# Patient Record
Sex: FEMALE | Race: WHITE | NOT HISPANIC OR LATINO | Employment: UNEMPLOYED | URBAN - METROPOLITAN AREA
[De-identification: names, ages, dates, MRNs, and addresses within clinical notes are randomized per-mention and may not be internally consistent; named-entity substitution may affect disease eponyms.]

---

## 2019-10-07 ENCOUNTER — OFFICE VISIT (OUTPATIENT)
Dept: FAMILY MEDICINE CLINIC | Facility: CLINIC | Age: 54
End: 2019-10-07

## 2019-10-07 VITALS
WEIGHT: 126 LBS | BODY MASS INDEX: 23.19 KG/M2 | OXYGEN SATURATION: 97 % | HEIGHT: 62 IN | HEART RATE: 66 BPM | SYSTOLIC BLOOD PRESSURE: 118 MMHG | TEMPERATURE: 96.3 F | DIASTOLIC BLOOD PRESSURE: 79 MMHG

## 2019-10-07 DIAGNOSIS — F51.01 PRIMARY INSOMNIA: ICD-10-CM

## 2019-10-07 DIAGNOSIS — F32.A DEPRESSION, UNSPECIFIED DEPRESSION TYPE: ICD-10-CM

## 2019-10-07 DIAGNOSIS — J40 BRONCHITIS: Primary | ICD-10-CM

## 2019-10-07 PROCEDURE — 99214 OFFICE O/P EST MOD 30 MIN: CPT | Performed by: FAMILY MEDICINE

## 2019-10-07 RX ORDER — CITALOPRAM 20 MG/1
20 TABLET ORAL DAILY
Qty: 90 TABLET | Refills: 3 | Status: SHIPPED | OUTPATIENT
Start: 2019-10-07 | End: 2020-05-19 | Stop reason: SDUPTHER

## 2019-10-07 RX ORDER — TRAZODONE HYDROCHLORIDE 50 MG/1
50 TABLET ORAL NIGHTLY
Qty: 30 TABLET | Refills: 3 | Status: SHIPPED | OUTPATIENT
Start: 2019-10-07 | End: 2020-01-22 | Stop reason: SDUPTHER

## 2019-10-07 RX ORDER — TRAZODONE HYDROCHLORIDE 50 MG/1
TABLET ORAL
COMMUNITY
Start: 2016-12-30 | End: 2019-10-07 | Stop reason: SDUPTHER

## 2019-10-07 RX ORDER — ALPRAZOLAM 0.5 MG/1
0.5 TABLET ORAL NIGHTLY PRN
Qty: 30 TABLET | Refills: 0 | Status: SHIPPED | OUTPATIENT
Start: 2019-10-07 | End: 2020-04-27

## 2019-10-07 RX ORDER — AMOXICILLIN 500 MG/1
500 CAPSULE ORAL 3 TIMES DAILY
Qty: 30 CAPSULE | Refills: 0 | Status: SHIPPED | OUTPATIENT
Start: 2019-10-07 | End: 2019-12-09

## 2019-10-07 NOTE — PROGRESS NOTES
Subjective   Chief Complaint   Patient presents with   • URI   • Depression     she wants to start taking meds again     Taylor Mo is a 53 y.o. female.     URI    This is a new problem. The current episode started in the past 7 days. The problem has been waxing and waning. There has been no fever. Associated symptoms include congestion, coughing and wheezing. Pertinent negatives include no abdominal pain, chest pain, ear pain, headaches, rash, rhinorrhea, sinus pain, sneezing or sore throat. She has tried nothing for the symptoms.   Depression   Visit Type: follow-up  Patient presents with the following symptoms: depressed mood, insomnia and psychomotor retardation.  Patient is not experiencing: palpitations, shortness of breath, suicidal ideas, suicidal planning and thoughts of death.  Frequency of symptoms: most days   Severity: moderate   Sleep quality: fair  Nighttime awakenings: occasional  Compliance with medications: She was previously on Celexa but stopped it after feeling better.        Insomnia   This is a chronic problem. The current episode started more than 1 year ago. The problem has been waxing and waning. Associated symptoms include congestion and coughing. Pertinent negatives include no abdominal pain, chest pain, chills, fever, headaches, rash or sore throat.      Past Medical History:   Diagnosis Date   • Depression    • Insomnia      Past Surgical History:   Procedure Laterality Date   • HERNIA REPAIR  2016     No Known Allergies  Social History     Socioeconomic History   • Marital status:      Spouse name: Not on file   • Number of children: Not on file   • Years of education: Not on file   • Highest education level: Not on file   Tobacco Use   • Smoking status: Former Smoker   • Smokeless tobacco: Never Used   • Tobacco comment: quit in 2018, started smoking age 14   Substance and Sexual Activity   • Alcohol use: Yes     Frequency: Monthly or less     Comment: caff. 2-3c qd   •  "Drug use: No     Social History     Tobacco Use   Smoking Status Former Smoker   Smokeless Tobacco Never Used   Tobacco Comment    quit in 2018, started smoking age 14       family history includes Hypertension in her brother, father, mother, and sister.  Current Outpatient Medications on File Prior to Visit   Medication Sig Dispense Refill   • [DISCONTINUED] traZODone (DESYREL) 50 MG tablet TRAZODONE HCL 50 MG TABS       No current facility-administered medications on file prior to visit.      Patient Active Problem List   Diagnosis   • Depression   • Insomnia   • Bronchitis       The following portions of the patient's history were reviewed and updated as appropriate: allergies, current medications, past family history, past medical history, past social history, past surgical history and problem list.    Review of Systems   Constitutional: Negative for chills and fever.   HENT: Positive for congestion. Negative for ear pain, rhinorrhea, sinus pressure, sneezing and sore throat.    Eyes: Negative for blurred vision.   Respiratory: Positive for cough and wheezing. Negative for shortness of breath.    Cardiovascular: Negative for chest pain and palpitations.   Gastrointestinal: Negative for abdominal pain.   Endocrine: Negative for polyuria.   Skin: Negative for rash.   Neurological: Negative for dizziness and headache.   Hematological: Negative for adenopathy.   Psychiatric/Behavioral: Positive for depressed mood. Negative for suicidal ideas. The patient has insomnia.        Objective   /79 (BP Location: Left arm, Patient Position: Sitting, Cuff Size: Adult)   Pulse 66   Temp 96.3 °F (35.7 °C) (Tympanic)   Ht 157.5 cm (62\")   Wt 57.2 kg (126 lb)   SpO2 97%   BMI 23.05 kg/m²   Physical Exam   Constitutional: She is oriented to person, place, and time. She appears well-developed. No distress.   HENT:   Head: Normocephalic.   Eyes: Conjunctivae and lids are normal.   Neck: Trachea normal. No thyroid mass " and no thyromegaly present.   Cardiovascular: Normal rate, regular rhythm and normal heart sounds.   Pulmonary/Chest: Effort normal. She has wheezes.   Lymphadenopathy:     She has no cervical adenopathy.   Neurological: She is alert and oriented to person, place, and time.   Skin: Skin is warm and dry.   Psychiatric: She has a normal mood and affect. Her speech is normal and behavior is normal. She is attentive.       No visits with results within 1 Week(s) from this visit.   Latest known visit with results is:   No results found for any previous visit.           Assessment/Plan   Problems Addressed this Visit        Respiratory    Bronchitis - Primary     Amoxil as prescribed.            Other    Depression     Psychological condition is worsening.  Medication changes per orders.  Psychological condition  will be reassessed at the next regular appointment.         Relevant Medications    traZODone (DESYREL) 50 MG tablet    citalopram (CELEXA) 20 MG tablet    ALPRAZolam (XANAX) 0.5 MG tablet    Insomnia     Restart Trazodone.  Alprazolam if needed.               Taylor was seen today for uri and depression.    Diagnoses and all orders for this visit:    Bronchitis    Primary insomnia    Depression, unspecified depression type    Other orders  -     amoxicillin (AMOXIL) 500 MG capsule; Take 1 capsule by mouth 3 (Three) Times a Day.  -     traZODone (DESYREL) 50 MG tablet; Take 1 tablet by mouth Every Night.  -     citalopram (CELEXA) 20 MG tablet; Take 1 tablet by mouth Daily.  -     ALPRAZolam (XANAX) 0.5 MG tablet; Take 1 tablet by mouth At Night As Needed for Anxiety.

## 2019-12-09 ENCOUNTER — OFFICE VISIT (OUTPATIENT)
Dept: SURGERY | Facility: CLINIC | Age: 54
End: 2019-12-09

## 2019-12-09 VITALS
HEART RATE: 73 BPM | WEIGHT: 128.2 LBS | OXYGEN SATURATION: 98 % | TEMPERATURE: 97.3 F | SYSTOLIC BLOOD PRESSURE: 121 MMHG | BODY MASS INDEX: 23.59 KG/M2 | DIASTOLIC BLOOD PRESSURE: 78 MMHG | HEIGHT: 62 IN

## 2019-12-09 DIAGNOSIS — K43.9 ABDOMINAL WALL HERNIA: Primary | ICD-10-CM

## 2019-12-09 DIAGNOSIS — R10.10 PAIN OF UPPER ABDOMEN: ICD-10-CM

## 2019-12-09 DIAGNOSIS — R10.33 PERIUMBILICAL ABDOMINAL PAIN: ICD-10-CM

## 2019-12-09 PROBLEM — Z83.3 FAMILY HISTORY OF DIABETES MELLITUS: Status: ACTIVE | Noted: 2019-12-09

## 2019-12-09 PROCEDURE — 99212 OFFICE O/P EST SF 10 MIN: CPT | Performed by: SURGERY

## 2019-12-09 NOTE — PROGRESS NOTES
Subjective   Taylor Mo is a 53 y.o. female.     History of present illness  Katelyn is seen in the office today for abdominal pain that seems to be located above the umbilicus.  In half years ago she had a laparoscopic umbilical hernia repair done with Davol dual Composix mesh/4 x 6 inch.  Is having pain and a pulling sensation in the midline near where the top of the mesh would be.  Positions make it worse if she sits and scrunches upper bends and turns a certain way it seems to cause a discomfort and a pulling.  I suspect that she probably has omentum stuck to the area and that it is pulling.  I explained there is no way to find that out short of doing a diagnostic laparoscopy.    I have suggested that she try physical therapy to see if that help will help.  If it does not then a diagnostic laparoscopy would be in order.    Past Medical History:   Diagnosis Date   • Depression    • Insomnia        Past Surgical History:   Procedure Laterality Date   • HERNIA REPAIR  2016       Outpatient Encounter Medications as of 12/9/2019   Medication Sig Dispense Refill   • ALPRAZolam (XANAX) 0.5 MG tablet Take 1 tablet by mouth At Night As Needed for Anxiety. 30 tablet 0   • traZODone (DESYREL) 50 MG tablet Take 1 tablet by mouth Every Night. 30 tablet 3   • citalopram (CELEXA) 20 MG tablet Take 1 tablet by mouth Daily. 90 tablet 3   • [DISCONTINUED] amoxicillin (AMOXIL) 500 MG capsule Take 1 capsule by mouth 3 (Three) Times a Day. 30 capsule 0     No facility-administered encounter medications on file as of 12/9/2019.        No Known Allergies    Family History   Problem Relation Age of Onset   • Hypertension Mother    • Hypertension Father    • Hypertension Sister    • Hypertension Brother        Social History     Socioeconomic History   • Marital status:      Spouse name: Not on file   • Number of children: Not on file   • Years of education: Not on file   • Highest education level: Not on file   Tobacco Use   •  Smoking status: Former Smoker   • Smokeless tobacco: Never Used   • Tobacco comment: quit in 2018, started smoking age 14   Substance and Sexual Activity   • Alcohol use: Yes     Frequency: Monthly or less     Comment: caff. 2-3c qd   • Drug use: No       The following portions of the patient's history were reviewed and updated as appropriate: allergies, current medications, past family history, past medical history, past social history, past surgical history and problem list.    Objective       Assessment/Plan   There are no diagnoses linked to this encounter.    All systems are reviewed and unremarkable with exception of the chief complaint    Physical exam shows a pleasant 53-year-old female.  HEENT is negative.  Heart is regular.  Lungs are clear.  Abdomen is soft tender in the midline above the umbilicus.  No obvious hernia recurrence.  No palpable mass.  Extremities show equal range of motion in the upper and lower extremities.  She has symmetrical strength and usage.  Neuro shows no obvious focal deficit.    Impression: Abdominal pain at the level of the mesh    Recommendation: Physical therapy.  If that does not help we would recommend diagnostic laparoscopy           Lyndon Turner,   12/9/2019  3:07 PM

## 2020-01-02 ENCOUNTER — TREATMENT (OUTPATIENT)
Dept: PHYSICAL THERAPY | Facility: CLINIC | Age: 55
End: 2020-01-02

## 2020-01-02 DIAGNOSIS — R10.10 PAIN OF UPPER ABDOMEN: Primary | ICD-10-CM

## 2020-01-02 PROCEDURE — 97110 THERAPEUTIC EXERCISES: CPT | Performed by: PHYSICAL THERAPIST

## 2020-01-02 PROCEDURE — 97162 PT EVAL MOD COMPLEX 30 MIN: CPT | Performed by: PHYSICAL THERAPIST

## 2020-01-02 NOTE — PROGRESS NOTES
"  Physical Therapy Initial Evaluation and Plan of Care      Patient: Taylor Mo   : 1965  Diagnosis/ICD-10 Code:  Pain of upper abdomen [R10.10]  Referring practitioner: Lyndon Turner DO  Date of Initial Visit: 2020  Today's Date: 2020  Patient seen for 1 sessions           Subjective Evaluation    History of Present Illness  Mechanism of injury: Patient presents to physical therapy with chief complaint of upper abdominal pain.  Patient reports that she has abdominal wall hernia repair in 2016.  Reports that she has continued to have pain in abdomen since.  Reports that she cannot feel it now, however reports that recently when she was \"thinner\" she states that she could feel small mass in area and MD believed it to be scar tissue.  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     Pain  Current pain ratin  At worst pain ratin  Quality: pulling and burning  Relieving factors: change in position and medications  Aggravating factors: lifting and squatting    Patient Goals  Patient goals for therapy: decreased pain, increased strength and return to sport/leisure activities  Patient goal: Wishes to have less pain with holding grandbaby.            Objective       Palpation     Additional Palpation Details  TTP noted at midline rectus abdominus superior to umbilical.  No soft tissue restriction noted with palpation with muscular relaxation or contraction.           Assessment & Plan     Assessment  Impairments: lacks appropriate home exercise program  Assessment details: Patient presents to physical therapy with chief complaint of abdominal pain near area of past repair of abdominal wall hernia.  Patient educated on abdominal stabilization exercise, as well as educated on proper lifting mechanics for use at work.  Initiated abdominal stabilization HEP.  Patient educated to " return for treatment if symptoms return or if proper technique with exercises cannot be achieved.    Prognosis: good  Functional Limitations: lifting  Goals  Plan Goals: In two weeks, patient will report compliance with I HEP.     In two weeks, patient will report at least 25% reduction in pain in abdomen with lifting at work.     Plan  Therapy options: will be seen for skilled physical therapy services  Planned therapy interventions: abdominal trunk stabilization, functional ROM exercises, home exercise program, IADL retraining, manual therapy, neuromuscular re-education, soft tissue mobilization, strengthening and body mechanics training  Frequency: 1x week  Duration in visits: 6        Manual Therapy:         mins  02082;  Therapeutic Exercise:    25     mins  10701;     Neuromuscular Concha:        mins  98425;    Therapeutic Activity:          mins  87213;     Gait Training:           mins  60760;     Ultrasound:          mins  45116;    Electrical Stimulation:         mins  85360 ( );  Dry Needling          mins self-pay    Timed Treatment:   25   mins   Total Treatment:     55   mins    PT SIGNATURE: Zeke Montoya, SAUL   DATE TREATMENT INITIATED: 1/2/2020    Initial Certification  Certification Period: 4/1/2020  I certify that the therapy services are furnished while this patient is under my care.  The services outlined above are required by this patient, and will be reviewed every 90 days.     PHYSICIAN: Lyndon Turner, DO      DATE:     Please sign and return via fax to [phone].. Thank you, [facility].

## 2020-01-22 RX ORDER — TRAZODONE HYDROCHLORIDE 50 MG/1
50 TABLET ORAL NIGHTLY
Qty: 90 TABLET | Refills: 0 | Status: SHIPPED | OUTPATIENT
Start: 2020-01-22 | End: 2020-11-02

## 2020-04-27 DIAGNOSIS — F41.9 ANXIETY: Primary | ICD-10-CM

## 2020-04-27 RX ORDER — ALPRAZOLAM 0.5 MG/1
TABLET ORAL
Qty: 30 TABLET | Refills: 0 | Status: SHIPPED | OUTPATIENT
Start: 2020-04-27 | End: 2021-05-14

## 2020-05-19 ENCOUNTER — APPOINTMENT (OUTPATIENT)
Dept: LAB | Facility: HOSPITAL | Age: 55
End: 2020-05-19

## 2020-05-19 ENCOUNTER — OFFICE VISIT (OUTPATIENT)
Dept: FAMILY MEDICINE CLINIC | Facility: CLINIC | Age: 55
End: 2020-05-19

## 2020-05-19 VITALS
HEIGHT: 64 IN | HEART RATE: 64 BPM | BODY MASS INDEX: 22.88 KG/M2 | TEMPERATURE: 97.7 F | WEIGHT: 134 LBS | DIASTOLIC BLOOD PRESSURE: 73 MMHG | OXYGEN SATURATION: 97 % | SYSTOLIC BLOOD PRESSURE: 119 MMHG

## 2020-05-19 DIAGNOSIS — Z12.31 ENCOUNTER FOR SCREENING MAMMOGRAM FOR BREAST CANCER: Primary | ICD-10-CM

## 2020-05-19 DIAGNOSIS — Z00.00 WELLNESS EXAMINATION: ICD-10-CM

## 2020-05-19 DIAGNOSIS — Z78.0 POSTMENOPAUSAL: ICD-10-CM

## 2020-05-19 LAB
ALBUMIN SERPL-MCNC: 4.4 G/DL (ref 3.5–5.2)
ALBUMIN/GLOB SERPL: 1.4 G/DL
ALP SERPL-CCNC: 91 U/L (ref 39–117)
ALT SERPL W P-5'-P-CCNC: 11 U/L (ref 1–33)
ANION GAP SERPL CALCULATED.3IONS-SCNC: 9.3 MMOL/L (ref 5–15)
AST SERPL-CCNC: 24 U/L (ref 1–32)
BILIRUB SERPL-MCNC: 0.4 MG/DL (ref 0.2–1.2)
BUN BLD-MCNC: 12 MG/DL (ref 6–20)
BUN/CREAT SERPL: 13.8 (ref 7–25)
CALCIUM SPEC-SCNC: 9.8 MG/DL (ref 8.6–10.5)
CHLORIDE SERPL-SCNC: 101 MMOL/L (ref 98–107)
CHOLEST SERPL-MCNC: 210 MG/DL (ref 0–200)
CO2 SERPL-SCNC: 28.7 MMOL/L (ref 22–29)
CREAT BLD-MCNC: 0.87 MG/DL (ref 0.57–1)
GFR SERPL CREATININE-BSD FRML MDRD: 67 ML/MIN/1.73
GLOBULIN UR ELPH-MCNC: 3.2 GM/DL
GLUCOSE BLD-MCNC: 94 MG/DL (ref 65–99)
HDLC SERPL-MCNC: 68 MG/DL (ref 40–60)
LDLC SERPL CALC-MCNC: 111 MG/DL (ref 0–100)
LDLC/HDLC SERPL: 1.63 {RATIO}
POTASSIUM BLD-SCNC: 4.2 MMOL/L (ref 3.5–5.2)
PROT SERPL-MCNC: 7.6 G/DL (ref 6–8.5)
SODIUM BLD-SCNC: 139 MMOL/L (ref 136–145)
TRIGL SERPL-MCNC: 157 MG/DL (ref 0–150)
VLDLC SERPL-MCNC: 31.4 MG/DL (ref 5–40)

## 2020-05-19 PROCEDURE — 36415 COLL VENOUS BLD VENIPUNCTURE: CPT | Performed by: FAMILY MEDICINE

## 2020-05-19 PROCEDURE — 80061 LIPID PANEL: CPT | Performed by: FAMILY MEDICINE

## 2020-05-19 PROCEDURE — 80053 COMPREHEN METABOLIC PANEL: CPT | Performed by: FAMILY MEDICINE

## 2020-05-19 PROCEDURE — 99396 PREV VISIT EST AGE 40-64: CPT | Performed by: FAMILY MEDICINE

## 2020-05-19 RX ORDER — CITALOPRAM 40 MG/1
40 TABLET ORAL DAILY
Qty: 90 TABLET | Refills: 1 | Status: SHIPPED | OUTPATIENT
Start: 2020-05-19 | End: 2021-06-07

## 2020-05-19 NOTE — PROGRESS NOTES
"Francis Mo is a 54 y.o. female and is here for a comprehensive physical exam. The patient reports no problems.    Do you take any herbs or supplements that were not prescribed by a doctor? no  Are you taking calcium supplements? no  Are you taking aspirin daily? no    The following portions of the patient's history were reviewed and updated as appropriate: allergies, current medications, past family history, past medical history, past social history, past surgical history and problem list.    Review of Systems  Do you have pain that bothers you in your daily life? yes  A comprehensive review of systems was negative except for: Musculoskeletal: positive for arthralgias    Objective   /73   Pulse 64   Temp 97.7 °F (36.5 °C)   Ht 161.9 cm (63.75\")   Wt 60.8 kg (134 lb)   SpO2 97%   BMI 23.18 kg/m²   General appearance: alert, appears stated age and cooperative  Head: Normocephalic, without obvious abnormality, atraumatic  Eyes: conjunctivae/corneas clear. PERRL, EOM's intact. Fundi benign.  Ears: normal TM's and external ear canals both ears  Neck: no adenopathy, no carotid bruit, no JVD, supple, symmetrical, trachea midline and thyroid not enlarged, symmetric, no tenderness/mass/nodules  Lungs: clear to auscultation bilaterally  Heart: regular rate and rhythm, S1, S2 normal, no murmur, click, rub or gallop  Abdomen: soft, non-tender; bowel sounds normal; no masses,  no organomegaly  Extremities: extremities normal, atraumatic, no cyanosis or edema  Skin: Skin color, texture, turgor normal. No rashes or lesions  Lymph nodes: Cervical, supraclavicular, and axillary nodes normal.  Neurologic: Grossly normal     No visits with results within 1 Week(s) from this visit.   Latest known visit with results is:   No results found for any previous visit.       Assessment/Plan   Healthy female exam.    There are no diagnoses linked to this encounter.  1. Well exam  2. Patient Counseling:  --Nutrition: " Stressed importance of moderation in sodium/caffeine intake, saturated fat and cholesterol, caloric balance, sufficient intake of fresh fruits, vegetables, fiber, calcium, iron, and 1 mg of folate supplement per day (for females capable of pregnancy).  --Discussed the issue of estrogen replacement, calcium supplement, and the daily use of baby aspirin.  --Exercise: Stressed the importance of regular exercise.   --Immunizations reviewed.  --Discussed benefits of screening colonoscopy.      3. Discussed the patient's BMI with her.  The BMI is in the acceptable range  4. Follow up in one year

## 2020-05-28 ENCOUNTER — TELEPHONE (OUTPATIENT)
Dept: FAMILY MEDICINE CLINIC | Facility: CLINIC | Age: 55
End: 2020-05-28

## 2020-06-01 NOTE — TELEPHONE ENCOUNTER
Pt called back and stated that she is willing to try the weekly pill. Send to [facility] on [address]

## 2020-06-02 RX ORDER — ALENDRONATE SODIUM 70 MG/1
70 TABLET ORAL
Qty: 12 TABLET | Refills: 3 | Status: SHIPPED | OUTPATIENT
Start: 2020-06-02 | End: 2021-05-03

## 2020-11-02 RX ORDER — TRAZODONE HYDROCHLORIDE 50 MG/1
TABLET ORAL
Qty: 90 TABLET | Refills: 0 | Status: SHIPPED | OUTPATIENT
Start: 2020-11-02 | End: 2021-01-27

## 2021-01-27 RX ORDER — TRAZODONE HYDROCHLORIDE 50 MG/1
TABLET ORAL
Qty: 90 TABLET | Refills: 0 | Status: SHIPPED | OUTPATIENT
Start: 2021-01-27 | End: 2021-04-30

## 2021-04-30 RX ORDER — TRAZODONE HYDROCHLORIDE 50 MG/1
TABLET ORAL
Qty: 90 TABLET | Refills: 0 | Status: SHIPPED | OUTPATIENT
Start: 2021-04-30 | End: 2021-07-25

## 2021-05-03 RX ORDER — ALENDRONATE SODIUM 70 MG/1
70 TABLET ORAL
Qty: 12 TABLET | Refills: 3 | Status: SHIPPED | OUTPATIENT
Start: 2021-05-03 | End: 2022-04-11

## 2021-05-13 DIAGNOSIS — F41.9 ANXIETY: ICD-10-CM

## 2021-05-14 RX ORDER — ALPRAZOLAM 0.5 MG/1
TABLET ORAL
Qty: 30 TABLET | Refills: 0 | Status: SHIPPED | OUTPATIENT
Start: 2021-05-14 | End: 2021-06-02 | Stop reason: SDUPTHER

## 2021-06-02 ENCOUNTER — LAB (OUTPATIENT)
Dept: FAMILY MEDICINE CLINIC | Facility: CLINIC | Age: 56
End: 2021-06-02

## 2021-06-02 ENCOUNTER — OFFICE VISIT (OUTPATIENT)
Dept: FAMILY MEDICINE CLINIC | Facility: CLINIC | Age: 56
End: 2021-06-02

## 2021-06-02 VITALS
OXYGEN SATURATION: 95 % | BODY MASS INDEX: 22.88 KG/M2 | DIASTOLIC BLOOD PRESSURE: 70 MMHG | WEIGHT: 134 LBS | TEMPERATURE: 97.3 F | SYSTOLIC BLOOD PRESSURE: 119 MMHG | HEIGHT: 64 IN | HEART RATE: 72 BPM

## 2021-06-02 DIAGNOSIS — F41.9 ANXIETY: ICD-10-CM

## 2021-06-02 DIAGNOSIS — Z11.59 NEED FOR HEPATITIS C SCREENING TEST: ICD-10-CM

## 2021-06-02 DIAGNOSIS — Z00.00 WELLNESS EXAMINATION: Primary | ICD-10-CM

## 2021-06-02 LAB
ALBUMIN SERPL-MCNC: 4.5 G/DL (ref 3.5–5.2)
ALBUMIN/GLOB SERPL: 1.6 G/DL
ALP SERPL-CCNC: 93 U/L (ref 39–117)
ALT SERPL W P-5'-P-CCNC: 7 U/L (ref 1–33)
ANION GAP SERPL CALCULATED.3IONS-SCNC: 10 MMOL/L (ref 5–15)
AST SERPL-CCNC: 23 U/L (ref 1–32)
BILIRUB SERPL-MCNC: 0.4 MG/DL (ref 0–1.2)
BUN SERPL-MCNC: 12 MG/DL (ref 6–20)
BUN/CREAT SERPL: 15.4 (ref 7–25)
CALCIUM SPEC-SCNC: 9.2 MG/DL (ref 8.6–10.5)
CHLORIDE SERPL-SCNC: 102 MMOL/L (ref 98–107)
CHOLEST SERPL-MCNC: 256 MG/DL (ref 0–200)
CO2 SERPL-SCNC: 27 MMOL/L (ref 22–29)
CREAT SERPL-MCNC: 0.78 MG/DL (ref 0.57–1)
GFR SERPL CREATININE-BSD FRML MDRD: 76 ML/MIN/1.73
GLOBULIN UR ELPH-MCNC: 2.8 GM/DL
GLUCOSE SERPL-MCNC: 92 MG/DL (ref 65–99)
HCV AB SER DONR QL: NORMAL
HDLC SERPL-MCNC: 66 MG/DL (ref 40–60)
LDLC SERPL CALC-MCNC: 159 MG/DL (ref 0–100)
LDLC/HDLC SERPL: 2.36 {RATIO}
POTASSIUM SERPL-SCNC: 4 MMOL/L (ref 3.5–5.2)
PROT SERPL-MCNC: 7.3 G/DL (ref 6–8.5)
SODIUM SERPL-SCNC: 139 MMOL/L (ref 136–145)
TRIGL SERPL-MCNC: 170 MG/DL (ref 0–150)
VLDLC SERPL-MCNC: 31 MG/DL (ref 5–40)

## 2021-06-02 PROCEDURE — 80061 LIPID PANEL: CPT | Performed by: FAMILY MEDICINE

## 2021-06-02 PROCEDURE — 80053 COMPREHEN METABOLIC PANEL: CPT | Performed by: FAMILY MEDICINE

## 2021-06-02 PROCEDURE — 36415 COLL VENOUS BLD VENIPUNCTURE: CPT | Performed by: FAMILY MEDICINE

## 2021-06-02 PROCEDURE — 86803 HEPATITIS C AB TEST: CPT | Performed by: FAMILY MEDICINE

## 2021-06-02 PROCEDURE — 99396 PREV VISIT EST AGE 40-64: CPT | Performed by: FAMILY MEDICINE

## 2021-06-02 RX ORDER — ALPRAZOLAM 0.5 MG/1
0.5 TABLET ORAL NIGHTLY PRN
Qty: 30 TABLET | Refills: 0 | Status: SHIPPED | OUTPATIENT
Start: 2021-06-02 | End: 2021-12-13

## 2021-06-02 RX ORDER — ALPRAZOLAM 0.5 MG/1
0.5 TABLET ORAL NIGHTLY PRN
Qty: 30 TABLET | Refills: 0 | Status: CANCELLED | OUTPATIENT
Start: 2021-06-02

## 2021-06-02 NOTE — PROGRESS NOTES
"Francis Mo is a 55 y.o. female and is here for a comprehensive physical exam. The patient reports no problems.    Do you take any herbs or supplements that were not prescribed by a doctor? yes  Are you taking calcium supplements? yes  Are you taking aspirin daily? no  The following portions of the patient's history were reviewed and updated as appropriate: allergies, current medications, past family history, past medical history, past social history, past surgical history and problem list.    Review of Systems  Do you have pain that bothers you in your daily life? yes  Pertinent items are noted in HPI.    Objective   /70 (BP Location: Left arm, Patient Position: Sitting, Cuff Size: Adult)   Pulse 72   Temp 97.3 °F (36.3 °C) (Infrared)   Ht 161.9 cm (63.75\")   Wt 60.8 kg (134 lb)   SpO2 95%   BMI 23.18 kg/m²   General appearance: alert, appears stated age and cooperative  Head: Normocephalic, without obvious abnormality, atraumatic  Eyes: conjunctivae/corneas clear. PERRL, EOM's intact. Fundi benign.  Ears: normal TM's and external ear canals both ears  Throat: lips, mucosa, and tongue normal; teeth and gums normal  Neck: no adenopathy, no carotid bruit, no JVD, supple, symmetrical, trachea midline and thyroid not enlarged, symmetric, no tenderness/mass/nodules  Lungs: clear to auscultation bilaterally  Heart: regular rate and rhythm, S1, S2 normal, no murmur, click, rub or gallop  Abdomen: soft, non-tender; bowel sounds normal; no masses,  no organomegaly  Extremities: extremities normal, atraumatic, no cyanosis or edema  Pulses: 2+ and symmetric  Skin: Skin color, texture, turgor normal. No rashes or lesions  Lymph nodes: Cervical, supraclavicular, and axillary nodes normal.     No visits with results within 1 Week(s) from this visit.   Latest known visit with results is:   Office Visit on 05/19/2020   Component Date Value Ref Range Status   • Glucose 05/19/2020 94  65 - 99 mg/dL Final "   • BUN 05/19/2020 12  6 - 20 mg/dL Final   • Creatinine 05/19/2020 0.87  0.57 - 1.00 mg/dL Final   • Sodium 05/19/2020 139  136 - 145 mmol/L Final   • Potassium 05/19/2020 4.2  3.5 - 5.2 mmol/L Final   • Chloride 05/19/2020 101  98 - 107 mmol/L Final   • CO2 05/19/2020 28.7  22.0 - 29.0 mmol/L Final   • Calcium 05/19/2020 9.8  8.6 - 10.5 mg/dL Final   • Total Protein 05/19/2020 7.6  6.0 - 8.5 g/dL Final   • Albumin 05/19/2020 4.40  3.50 - 5.20 g/dL Final   • ALT (SGPT) 05/19/2020 11  1 - 33 U/L Final   • AST (SGOT) 05/19/2020 24  1 - 32 U/L Final   • Alkaline Phosphatase 05/19/2020 91  39 - 117 U/L Final   • Total Bilirubin 05/19/2020 0.4  0.2 - 1.2 mg/dL Final   • eGFR Non African Amer 05/19/2020 67  >60 mL/min/1.73 Final   • Globulin 05/19/2020 3.2  gm/dL Final   • A/G Ratio 05/19/2020 1.4  g/dL Final   • BUN/Creatinine Ratio 05/19/2020 13.8  7.0 - 25.0 Final   • Anion Gap 05/19/2020 9.3  5.0 - 15.0 mmol/L Final   • Total Cholesterol 05/19/2020 210* 0 - 200 mg/dL Final   • Triglycerides 05/19/2020 157* 0 - 150 mg/dL Final   • HDL Cholesterol 05/19/2020 68* 40 - 60 mg/dL Final   • LDL Cholesterol  05/19/2020 111* 0 - 100 mg/dL Final   • VLDL Cholesterol 05/19/2020 31.4  5 - 40 mg/dL Final   • LDL/HDL Ratio 05/19/2020 1.63   Final       Assessment/Plan   Healthy female exam.   There are no diagnoses linked to this encounter.  1. Well exam.  2. Patient Counseling:  --Nutrition: Stressed importance of moderation in sodium/caffeine intake, saturated fat and cholesterol, caloric balance, sufficient intake of fresh fruits, vegetables, fiber, calcium, iron  --Discussed the issue of estrogen replacement, calcium supplement, and the daily use of baby aspirin.  --Exercise: Stressed the importance of regular exercise.   --Dental health: Discussed importance of regular tooth brushing, flossing, and dental visits.  --Immunizations reviewed.  --Discussed benefits of screening colonoscopy.    3. Discussed the patient's BMI with  her.  The BMI is in the acceptable range  4. Follow up in one year

## 2021-06-04 RX ORDER — ATORVASTATIN CALCIUM 20 MG/1
20 TABLET, FILM COATED ORAL DAILY
Qty: 90 TABLET | Refills: 3 | Status: SHIPPED | OUTPATIENT
Start: 2021-06-04 | End: 2022-06-12

## 2021-06-07 RX ORDER — CITALOPRAM 40 MG/1
TABLET ORAL
Qty: 90 TABLET | Refills: 3 | Status: SHIPPED | OUTPATIENT
Start: 2021-06-07 | End: 2022-06-12

## 2021-06-20 PROBLEM — F41.9 ANXIETY: Status: ACTIVE | Noted: 2021-06-20

## 2021-06-20 PROBLEM — Z11.59 NEED FOR HEPATITIS C SCREENING TEST: Status: ACTIVE | Noted: 2021-06-20

## 2021-07-25 RX ORDER — TRAZODONE HYDROCHLORIDE 50 MG/1
TABLET ORAL
Qty: 90 TABLET | Refills: 0 | Status: SHIPPED | OUTPATIENT
Start: 2021-07-25

## 2021-12-12 DIAGNOSIS — F41.9 ANXIETY: ICD-10-CM

## 2021-12-13 RX ORDER — ALPRAZOLAM 0.5 MG/1
0.5 TABLET ORAL NIGHTLY PRN
Qty: 30 TABLET | Refills: 0 | Status: SHIPPED | OUTPATIENT
Start: 2021-12-13 | End: 2022-08-12 | Stop reason: SDUPTHER

## 2022-04-11 RX ORDER — ALENDRONATE SODIUM 70 MG/1
70 TABLET ORAL
Qty: 12 TABLET | Refills: 1 | Status: SHIPPED | OUTPATIENT
Start: 2022-04-11 | End: 2022-10-03

## 2022-06-12 RX ORDER — ATORVASTATIN CALCIUM 20 MG/1
TABLET, FILM COATED ORAL
Qty: 90 TABLET | Refills: 0 | Status: SHIPPED | OUTPATIENT
Start: 2022-06-12 | End: 2022-09-07

## 2022-06-12 RX ORDER — CITALOPRAM 40 MG/1
TABLET ORAL
Qty: 90 TABLET | Refills: 0 | Status: SHIPPED | OUTPATIENT
Start: 2022-06-12 | End: 2022-09-07

## 2022-08-12 ENCOUNTER — LAB (OUTPATIENT)
Dept: FAMILY MEDICINE CLINIC | Facility: CLINIC | Age: 57
End: 2022-08-12

## 2022-08-12 ENCOUNTER — OFFICE VISIT (OUTPATIENT)
Dept: FAMILY MEDICINE CLINIC | Facility: CLINIC | Age: 57
End: 2022-08-12

## 2022-08-12 VITALS
WEIGHT: 133 LBS | BODY MASS INDEX: 22.71 KG/M2 | OXYGEN SATURATION: 95 % | HEIGHT: 64 IN | HEART RATE: 59 BPM | SYSTOLIC BLOOD PRESSURE: 128 MMHG | DIASTOLIC BLOOD PRESSURE: 78 MMHG | TEMPERATURE: 97.4 F

## 2022-08-12 DIAGNOSIS — F41.9 ANXIETY: ICD-10-CM

## 2022-08-12 DIAGNOSIS — Z78.0 POSTMENOPAUSAL: ICD-10-CM

## 2022-08-12 DIAGNOSIS — Z00.00 WELLNESS EXAMINATION: Primary | ICD-10-CM

## 2022-08-12 DIAGNOSIS — Z12.31 ENCOUNTER FOR SCREENING MAMMOGRAM FOR BREAST CANCER: ICD-10-CM

## 2022-08-12 LAB
ALBUMIN SERPL-MCNC: 4.2 G/DL (ref 3.5–5.2)
ALBUMIN/GLOB SERPL: 1.8 G/DL
ALP SERPL-CCNC: 77 U/L (ref 39–117)
ALT SERPL W P-5'-P-CCNC: 10 U/L (ref 1–33)
ANION GAP SERPL CALCULATED.3IONS-SCNC: 8.3 MMOL/L (ref 5–15)
AST SERPL-CCNC: 21 U/L (ref 1–32)
BILIRUB SERPL-MCNC: 0.3 MG/DL (ref 0–1.2)
BUN SERPL-MCNC: 12 MG/DL (ref 6–20)
BUN/CREAT SERPL: 16 (ref 7–25)
CALCIUM SPEC-SCNC: 9.2 MG/DL (ref 8.6–10.5)
CHLORIDE SERPL-SCNC: 102 MMOL/L (ref 98–107)
CHOLEST SERPL-MCNC: 157 MG/DL (ref 0–200)
CO2 SERPL-SCNC: 27.7 MMOL/L (ref 22–29)
CREAT SERPL-MCNC: 0.75 MG/DL (ref 0.57–1)
EGFRCR SERPLBLD CKD-EPI 2021: 92.4 ML/MIN/1.73
GLOBULIN UR ELPH-MCNC: 2.4 GM/DL
GLUCOSE SERPL-MCNC: 86 MG/DL (ref 65–99)
HDLC SERPL-MCNC: 62 MG/DL (ref 40–60)
LDLC SERPL CALC-MCNC: 68 MG/DL (ref 0–100)
LDLC/HDLC SERPL: 1.02 {RATIO}
POTASSIUM SERPL-SCNC: 3.9 MMOL/L (ref 3.5–5.2)
PROT SERPL-MCNC: 6.6 G/DL (ref 6–8.5)
SODIUM SERPL-SCNC: 138 MMOL/L (ref 136–145)
TRIGL SERPL-MCNC: 158 MG/DL (ref 0–150)
VLDLC SERPL-MCNC: 27 MG/DL (ref 5–40)

## 2022-08-12 PROCEDURE — 80053 COMPREHEN METABOLIC PANEL: CPT | Performed by: FAMILY MEDICINE

## 2022-08-12 PROCEDURE — 80061 LIPID PANEL: CPT | Performed by: FAMILY MEDICINE

## 2022-08-12 PROCEDURE — 36415 COLL VENOUS BLD VENIPUNCTURE: CPT | Performed by: FAMILY MEDICINE

## 2022-08-12 PROCEDURE — 99396 PREV VISIT EST AGE 40-64: CPT | Performed by: FAMILY MEDICINE

## 2022-08-12 RX ORDER — BUPROPION HYDROCHLORIDE 150 MG/1
150 TABLET ORAL DAILY
Qty: 90 TABLET | Refills: 1 | Status: SHIPPED | OUTPATIENT
Start: 2022-08-12 | End: 2023-02-08

## 2022-08-12 RX ORDER — ALPRAZOLAM 0.5 MG/1
0.5 TABLET ORAL NIGHTLY PRN
Qty: 30 TABLET | Refills: 0 | Status: SHIPPED | OUTPATIENT
Start: 2022-08-12

## 2022-08-12 RX ORDER — UBIDECARENONE 75 MG
50 CAPSULE ORAL DAILY
COMMUNITY

## 2022-08-12 RX ORDER — MELATONIN
1000 DAILY
COMMUNITY

## 2022-08-12 NOTE — PROGRESS NOTES
"Francis Mo is a 56 y.o. female and is here for a comprehensive physical exam. The patient reports no problems.    Do you take any herbs or supplements that were not prescribed by a doctor? not asked  Are you taking calcium supplements? no  Are you taking aspirin daily? no    The following portions of the patient's history were reviewed and updated as appropriate: allergies, current medications, past family history, past medical history, past social history, past surgical history and problem list.    Review of Systems  Do you have pain that bothers you in your daily life? no  A comprehensive review of systems was negative.    Objective   /78 (BP Location: Left arm, Patient Position: Sitting, Cuff Size: Small Adult)   Pulse 59   Temp 97.4 °F (36.3 °C) (Infrared)   Ht 162.6 cm (64\")   Wt 60.3 kg (133 lb)   SpO2 95%   BMI 22.83 kg/m²   General appearance: alert, appears stated age and cooperative  Head: Normocephalic, without obvious abnormality, atraumatic  Eyes: conjunctivae/corneas clear. PERRL, EOM's intact. Fundi benign.  Ears: normal TM's and external ear canals both ears  Throat: lips, mucosa, and tongue normal; teeth and gums normal  Neck: no adenopathy, no JVD, supple, symmetrical, trachea midline and thyroid not enlarged, symmetric, no tenderness/mass/nodules  Back: symmetric, no curvature. ROM normal. No CVA tenderness.  Lungs: clear to auscultation bilaterally  Heart: regular rate and rhythm, S1, S2 normal, no murmur, click, rub or gallop  Abdomen: soft, non-tender; bowel sounds normal; no masses,  no organomegaly  Extremities: extremities normal, atraumatic, no cyanosis or edema  Pulses: 2+ and symmetric  Skin: Skin color, texture, turgor normal. No rashes or lesions  Lymph nodes: Cervical, supraclavicular, and axillary nodes normal.  Neurologic: Grossly normal     No visits with results within 1 Week(s) from this visit.   Latest known visit with results is:   Lab on 06/02/2021 "   Component Date Value Ref Range Status   • Hepatitis C Ab 06/02/2021 Non-Reactive  Non-Reactive Final       Assessment & Plan   Healthy female exam.   Diagnoses and all orders for this visit:    1. Wellness examination (Primary)  -     Comprehensive Metabolic Panel  -     Lipid Panel    2. Anxiety  -     ALPRAZolam (XANAX) 0.5 MG tablet; Take 1 tablet by mouth At Night As Needed for Anxiety.  Dispense: 30 tablet; Refill: 0    3. Encounter for screening mammogram for breast cancer  -     Mammo Screening Digital Tomosynthesis Bilateral With CAD; Future    4. Postmenopausal  -     DEXA Bone Density Axial    Other orders  -     buPROPion XL (Wellbutrin XL) 150 MG 24 hr tablet; Take 1 tablet by mouth Daily.  Dispense: 90 tablet; Refill: 1      1. Well exam.  2. Patient Counseling:  --Nutrition: Stressed importance of moderation in sodium/caffeine intake, saturated fat and cholesterol, caloric balance, sufficient intake of fresh fruits, vegetables, fiber, calcium, iron  --Exercise: Stressed the importance of regular exercise.   --Dental health: Discussed importance of regular tooth brushing, flossing, and dental visits.  --Immunizations reviewed.  --Discussed benefits of screening colonoscopy.    3. Discussed the patient's BMI with her.  The BMI is in the acceptable range  4. Follow up in one year

## 2022-09-07 RX ORDER — CITALOPRAM 40 MG/1
TABLET ORAL
Qty: 90 TABLET | Refills: 2 | Status: SHIPPED | OUTPATIENT
Start: 2022-09-07

## 2022-09-07 RX ORDER — ATORVASTATIN CALCIUM 20 MG/1
TABLET, FILM COATED ORAL
Qty: 90 TABLET | Refills: 2 | Status: SHIPPED | OUTPATIENT
Start: 2022-09-07

## 2022-10-03 RX ORDER — ALENDRONATE SODIUM 70 MG/1
70 TABLET ORAL
Qty: 12 TABLET | Refills: 1 | Status: SHIPPED | OUTPATIENT
Start: 2022-10-03 | End: 2023-03-31

## 2023-02-08 RX ORDER — BUPROPION HYDROCHLORIDE 150 MG/1
TABLET ORAL
Qty: 90 TABLET | Refills: 1 | Status: SHIPPED | OUTPATIENT
Start: 2023-02-08

## 2023-03-31 RX ORDER — ALENDRONATE SODIUM 70 MG/1
70 TABLET ORAL
Qty: 12 TABLET | Refills: 1 | Status: SHIPPED | OUTPATIENT
Start: 2023-03-31

## 2023-06-09 RX ORDER — ATORVASTATIN CALCIUM 20 MG/1
TABLET, FILM COATED ORAL
Qty: 90 TABLET | Refills: 1 | Status: SHIPPED | OUTPATIENT
Start: 2023-06-09

## 2023-06-09 RX ORDER — CITALOPRAM 40 MG/1
TABLET ORAL
Qty: 90 TABLET | Refills: 1 | Status: SHIPPED | OUTPATIENT
Start: 2023-06-09

## 2023-08-15 ENCOUNTER — OFFICE VISIT (OUTPATIENT)
Dept: FAMILY MEDICINE CLINIC | Facility: CLINIC | Age: 58
End: 2023-08-15
Payer: MEDICAID

## 2023-08-15 ENCOUNTER — LAB (OUTPATIENT)
Dept: FAMILY MEDICINE CLINIC | Facility: CLINIC | Age: 58
End: 2023-08-15
Payer: MEDICAID

## 2023-08-15 VITALS
SYSTOLIC BLOOD PRESSURE: 113 MMHG | HEART RATE: 73 BPM | DIASTOLIC BLOOD PRESSURE: 81 MMHG | OXYGEN SATURATION: 95 % | TEMPERATURE: 97.1 F | WEIGHT: 128.4 LBS | BODY MASS INDEX: 21.92 KG/M2 | HEIGHT: 64 IN

## 2023-08-15 DIAGNOSIS — Z00.00 WELLNESS EXAMINATION: Primary | ICD-10-CM

## 2023-08-15 DIAGNOSIS — F41.9 ANXIETY: ICD-10-CM

## 2023-08-15 LAB
ALBUMIN SERPL-MCNC: 4.3 G/DL (ref 3.5–5.2)
ALBUMIN/GLOB SERPL: 1.6 G/DL
ALP SERPL-CCNC: 82 U/L (ref 39–117)
ALT SERPL W P-5'-P-CCNC: 14 U/L (ref 1–33)
ANION GAP SERPL CALCULATED.3IONS-SCNC: 12.8 MMOL/L (ref 5–15)
AST SERPL-CCNC: 36 U/L (ref 1–32)
BASOPHILS # BLD AUTO: 0.06 10*3/MM3 (ref 0–0.2)
BASOPHILS NFR BLD AUTO: 1 % (ref 0–1.5)
BILIRUB SERPL-MCNC: 0.3 MG/DL (ref 0–1.2)
BUN SERPL-MCNC: 12 MG/DL (ref 6–20)
BUN/CREAT SERPL: 14 (ref 7–25)
CALCIUM SPEC-SCNC: 9.1 MG/DL (ref 8.6–10.5)
CHLORIDE SERPL-SCNC: 104 MMOL/L (ref 98–107)
CHOLEST SERPL-MCNC: 139 MG/DL (ref 0–200)
CO2 SERPL-SCNC: 24.2 MMOL/L (ref 22–29)
CREAT SERPL-MCNC: 0.86 MG/DL (ref 0.57–1)
DEPRECATED RDW RBC AUTO: 42 FL (ref 37–54)
EGFRCR SERPLBLD CKD-EPI 2021: 77.9 ML/MIN/1.73
EOSINOPHIL # BLD AUTO: 0.12 10*3/MM3 (ref 0–0.4)
EOSINOPHIL NFR BLD AUTO: 2 % (ref 0.3–6.2)
ERYTHROCYTE [DISTWIDTH] IN BLOOD BY AUTOMATED COUNT: 12 % (ref 12.3–15.4)
FSH SERPL-ACNC: >200 MIU/ML
GLOBULIN UR ELPH-MCNC: 2.7 GM/DL
GLUCOSE SERPL-MCNC: 81 MG/DL (ref 65–99)
HCT VFR BLD AUTO: 44.6 % (ref 34–46.6)
HDLC SERPL-MCNC: 59 MG/DL (ref 40–60)
HGB BLD-MCNC: 15.3 G/DL (ref 12–15.9)
IMM GRANULOCYTES # BLD AUTO: 0.02 10*3/MM3 (ref 0–0.05)
IMM GRANULOCYTES NFR BLD AUTO: 0.3 % (ref 0–0.5)
LDLC SERPL CALC-MCNC: 63 MG/DL (ref 0–100)
LDLC/HDLC SERPL: 1.05 {RATIO}
LH SERPL-ACNC: 70.6 MIU/ML
LYMPHOCYTES # BLD AUTO: 2.19 10*3/MM3 (ref 0.7–3.1)
LYMPHOCYTES NFR BLD AUTO: 37.1 % (ref 19.6–45.3)
MCH RBC QN AUTO: 32.2 PG (ref 26.6–33)
MCHC RBC AUTO-ENTMCNC: 34.3 G/DL (ref 31.5–35.7)
MCV RBC AUTO: 93.9 FL (ref 79–97)
MONOCYTES # BLD AUTO: 0.55 10*3/MM3 (ref 0.1–0.9)
MONOCYTES NFR BLD AUTO: 9.3 % (ref 5–12)
NEUTROPHILS NFR BLD AUTO: 2.97 10*3/MM3 (ref 1.7–7)
NEUTROPHILS NFR BLD AUTO: 50.3 % (ref 42.7–76)
NRBC BLD AUTO-RTO: 0 /100 WBC (ref 0–0.2)
PLATELET # BLD AUTO: 325 10*3/MM3 (ref 140–450)
PMV BLD AUTO: 10 FL (ref 6–12)
POTASSIUM SERPL-SCNC: 4.7 MMOL/L (ref 3.5–5.2)
PROLACTIN SERPL-MCNC: 21.3 NG/ML (ref 4.79–23.3)
PROT SERPL-MCNC: 7 G/DL (ref 6–8.5)
RBC # BLD AUTO: 4.75 10*6/MM3 (ref 3.77–5.28)
SODIUM SERPL-SCNC: 141 MMOL/L (ref 136–145)
TRIGL SERPL-MCNC: 91 MG/DL (ref 0–150)
TSH SERPL DL<=0.05 MIU/L-ACNC: 2.74 UIU/ML (ref 0.27–4.2)
VLDLC SERPL-MCNC: 17 MG/DL (ref 5–40)
WBC NRBC COR # BLD: 5.91 10*3/MM3 (ref 3.4–10.8)

## 2023-08-15 PROCEDURE — 99396 PREV VISIT EST AGE 40-64: CPT | Performed by: FAMILY MEDICINE

## 2023-08-15 PROCEDURE — 84146 ASSAY OF PROLACTIN: CPT | Performed by: FAMILY MEDICINE

## 2023-08-15 PROCEDURE — 83002 ASSAY OF GONADOTROPIN (LH): CPT | Performed by: FAMILY MEDICINE

## 2023-08-15 PROCEDURE — 36415 COLL VENOUS BLD VENIPUNCTURE: CPT | Performed by: FAMILY MEDICINE

## 2023-08-15 PROCEDURE — 1160F RVW MEDS BY RX/DR IN RCRD: CPT | Performed by: FAMILY MEDICINE

## 2023-08-15 PROCEDURE — 83001 ASSAY OF GONADOTROPIN (FSH): CPT | Performed by: FAMILY MEDICINE

## 2023-08-15 PROCEDURE — 80050 GENERAL HEALTH PANEL: CPT | Performed by: FAMILY MEDICINE

## 2023-08-15 PROCEDURE — 82627 DEHYDROEPIANDROSTERONE: CPT | Performed by: FAMILY MEDICINE

## 2023-08-15 PROCEDURE — 84402 ASSAY OF FREE TESTOSTERONE: CPT | Performed by: FAMILY MEDICINE

## 2023-08-15 PROCEDURE — 1159F MED LIST DOCD IN RCRD: CPT | Performed by: FAMILY MEDICINE

## 2023-08-15 PROCEDURE — 80061 LIPID PANEL: CPT | Performed by: FAMILY MEDICINE

## 2023-08-15 RX ORDER — ALPRAZOLAM 0.5 MG/1
0.5 TABLET ORAL NIGHTLY PRN
Qty: 30 TABLET | Refills: 0 | Status: SHIPPED | OUTPATIENT
Start: 2023-08-15

## 2023-08-15 RX ORDER — ALBUTEROL SULFATE 90 UG/1
2 AEROSOL, METERED RESPIRATORY (INHALATION) EVERY 4 HOURS PRN
Qty: 8 G | Refills: 0 | Status: SHIPPED | OUTPATIENT
Start: 2023-08-15

## 2023-08-15 NOTE — PROGRESS NOTES
"Subjective   Taylor Mo is a 57 y.o. female and is here for a comprehensive physical exam. The patient reports no problems. She would like to have some hormone levels tested due to decreased libido.    Do you take any herbs or supplements that were not prescribed by a doctor? no  Are you taking calcium supplements? no  Are you taking aspirin daily? no    The following portions of the patient's history were reviewed and updated as appropriate: allergies, current medications, past family history, past medical history, past social history, past surgical history, and problem list.    Review of Systems  Do you have pain that bothers you in your daily life? no  Pertinent items are noted in HPI.    Objective   /81 (BP Location: Left arm, Patient Position: Sitting, Cuff Size: Adult)   Pulse 73   Temp 97.1 øF (36.2 øC) (Infrared)   Ht 162.6 cm (64\")   Wt 58.2 kg (128 lb 6.4 oz)   SpO2 95%   BMI 22.04 kg/mý   General appearance: alert, appears stated age, and cooperative  Head: Normocephalic, without obvious abnormality, atraumatic  Eyes: conjunctivae/corneas clear. PERRL, EOM's intact. Fundi benign.  Ears: normal TM's and external ear canals both ears  Throat: lips, mucosa, and tongue normal; teeth and gums normal  Neck: no adenopathy, no JVD, supple, symmetrical, trachea midline, and thyroid not enlarged, symmetric, no tenderness/mass/nodules  Lungs: clear to auscultation bilaterally  Heart: regular rate and rhythm, S1, S2 normal, no murmur, click, rub or gallop  Abdomen: soft, non-tender; bowel sounds normal; no masses,  no organomegaly  Extremities: extremities normal, atraumatic, no cyanosis or edema  Pulses: 2+ and symmetric  Skin: Skin color, texture, turgor normal. No rashes or lesions  Lymph nodes: Cervical, supraclavicular, and axillary nodes normal.  Neurologic: Grossly normal     No visits with results within 1 Week(s) from this visit.   Latest known visit with results is:   Office Visit on " 08/12/2022   Component Date Value Ref Range Status    Glucose 08/12/2022 86  65 - 99 mg/dL Final    BUN 08/12/2022 12  6 - 20 mg/dL Final    Creatinine 08/12/2022 0.75  0.57 - 1.00 mg/dL Final    Sodium 08/12/2022 138  136 - 145 mmol/L Final    Potassium 08/12/2022 3.9  3.5 - 5.2 mmol/L Final    Chloride 08/12/2022 102  98 - 107 mmol/L Final    CO2 08/12/2022 27.7  22.0 - 29.0 mmol/L Final    Calcium 08/12/2022 9.2  8.6 - 10.5 mg/dL Final    Total Protein 08/12/2022 6.6  6.0 - 8.5 g/dL Final    Albumin 08/12/2022 4.20  3.50 - 5.20 g/dL Final    ALT (SGPT) 08/12/2022 10  1 - 33 U/L Final    AST (SGOT) 08/12/2022 21  1 - 32 U/L Final    Alkaline Phosphatase 08/12/2022 77  39 - 117 U/L Final    Total Bilirubin 08/12/2022 0.3  0.0 - 1.2 mg/dL Final    Globulin 08/12/2022 2.4  gm/dL Final    A/G Ratio 08/12/2022 1.8  g/dL Final    BUN/Creatinine Ratio 08/12/2022 16.0  7.0 - 25.0 Final    Anion Gap 08/12/2022 8.3  5.0 - 15.0 mmol/L Final    eGFR 08/12/2022 92.4  >60.0 mL/min/1.73 Final    National Kidney Foundation and American Society of Nephrology (ASN) Task Force recommended calculation based on the Chronic Kidney Disease Epidemiology Collaboration (CKD-EPI) equation refit without adjustment for race.    Total Cholesterol 08/12/2022 157  0 - 200 mg/dL Final    Triglycerides 08/12/2022 158 (H)  0 - 150 mg/dL Final    HDL Cholesterol 08/12/2022 62 (H)  40 - 60 mg/dL Final    LDL Cholesterol  08/12/2022 68  0 - 100 mg/dL Final    VLDL Cholesterol 08/12/2022 27  5 - 40 mg/dL Final    LDL/HDL Ratio 08/12/2022 1.02   Final       Assessment & Plan   Healthy female exam.    Diagnoses and all orders for this visit:    1. Wellness examination (Primary)  -     CBC & Differential  -     Comprehensive Metabolic Panel  -     Lipid Panel  -     DHEA-sulfate; Future  -     Follicle stimulating hormone; Future  -     Luteinizing hormone; Future  -     Prolactin; Future  -     Testosterone Free Direct; Future  -     TSH; Future  -      albuterol sulfate  (90 Base) MCG/ACT inhaler; Inhale 2 puffs Every 4 (Four) Hours As Needed for Wheezing.  Dispense: 8 g; Refill: 0  -     DHEA-sulfate  -     Follicle stimulating hormone  -     Luteinizing hormone  -     Prolactin  -     Testosterone Free Direct  -     TSH    2. Anxiety  -     ALPRAZolam (XANAX) 0.5 MG tablet; Take 1 tablet by mouth At Night As Needed for Anxiety.  Dispense: 30 tablet; Refill: 0      1. Well exam  2. Patient Counseling:  --Nutrition: Stressed importance of moderation in sodium/caffeine intake, saturated fat and cholesterol, caloric balance, sufficient intake of fresh fruits, vegetables, fiber, calcium, iron  --Exercise: Stressed the importance of regular exercise.   --Dental health: Discussed importance of regular tooth brushing, flossing, and dental visits.  --Immunizations reviewed.  --Discussed benefits of screening colonoscopy.    3. Discussed the patient's BMI with her.  The BMI is in the acceptable range  4. Follow up in one year

## 2023-08-16 LAB — DHEA-S SERPL-MCNC: 73.5 UG/DL (ref 29.4–220.5)

## 2023-08-21 RX ORDER — BUPROPION HYDROCHLORIDE 150 MG/1
TABLET ORAL
Qty: 90 TABLET | Refills: 1 | Status: SHIPPED | OUTPATIENT
Start: 2023-08-21

## 2023-08-22 LAB — TESTOST FREE SERPL-MCNC: 0.6 PG/ML (ref 0–4.2)

## 2023-09-17 DIAGNOSIS — Z00.00 WELLNESS EXAMINATION: ICD-10-CM

## 2023-09-18 RX ORDER — ALBUTEROL SULFATE 90 UG/1
AEROSOL, METERED RESPIRATORY (INHALATION)
Qty: 18 G | Refills: 2 | Status: SHIPPED | OUTPATIENT
Start: 2023-09-18

## 2023-09-22 RX ORDER — ALENDRONATE SODIUM 70 MG/1
70 TABLET ORAL
Qty: 12 TABLET | Refills: 1 | Status: SHIPPED | OUTPATIENT
Start: 2023-09-22

## 2023-12-06 RX ORDER — CITALOPRAM 40 MG/1
TABLET ORAL
Qty: 90 TABLET | Refills: 1 | Status: SHIPPED | OUTPATIENT
Start: 2023-12-06

## 2023-12-11 RX ORDER — ATORVASTATIN CALCIUM 20 MG/1
TABLET, FILM COATED ORAL
Qty: 90 TABLET | Refills: 1 | Status: SHIPPED | OUTPATIENT
Start: 2023-12-11

## 2024-02-14 RX ORDER — BUPROPION HYDROCHLORIDE 150 MG/1
TABLET ORAL
Qty: 90 TABLET | Refills: 1 | Status: SHIPPED | OUTPATIENT
Start: 2024-02-14

## 2024-03-12 RX ORDER — ALENDRONATE SODIUM 70 MG/1
70 TABLET ORAL
Qty: 12 TABLET | Refills: 1 | Status: SHIPPED | OUTPATIENT
Start: 2024-03-12

## 2024-05-10 ENCOUNTER — OFFICE VISIT (OUTPATIENT)
Dept: FAMILY MEDICINE CLINIC | Facility: CLINIC | Age: 59
End: 2024-05-10
Payer: MEDICAID

## 2024-05-10 VITALS
DIASTOLIC BLOOD PRESSURE: 74 MMHG | HEART RATE: 67 BPM | BODY MASS INDEX: 22.2 KG/M2 | TEMPERATURE: 97.3 F | SYSTOLIC BLOOD PRESSURE: 109 MMHG | HEIGHT: 64 IN | WEIGHT: 130 LBS | OXYGEN SATURATION: 96 %

## 2024-05-10 DIAGNOSIS — J40 BRONCHITIS: Primary | ICD-10-CM

## 2024-05-10 PROBLEM — Z11.59 NEED FOR HEPATITIS C SCREENING TEST: Status: RESOLVED | Noted: 2021-06-20 | Resolved: 2024-05-10

## 2024-05-10 PROCEDURE — 99213 OFFICE O/P EST LOW 20 MIN: CPT | Performed by: FAMILY MEDICINE

## 2024-05-10 RX ORDER — AZITHROMYCIN 250 MG/1
TABLET, FILM COATED ORAL
Qty: 6 TABLET | Refills: 0 | Status: SHIPPED | OUTPATIENT
Start: 2024-05-10

## 2024-05-21 ENCOUNTER — TELEPHONE (OUTPATIENT)
Dept: FAMILY MEDICINE CLINIC | Facility: CLINIC | Age: 59
End: 2024-05-21

## 2024-05-21 DIAGNOSIS — J40 BRONCHITIS: Primary | ICD-10-CM

## 2024-05-21 NOTE — TELEPHONE ENCOUNTER
Caller: Taylor Mo A    Relationship to patient: Self    Patient is needing: PATIENT STATES SHE WAS ADVISED AFTER COMPLETING THE ZPAK TO CALL AND LET THE PCP KNOW IF SHE STILL HAD A COUGH AND A PRESCRIPTION WILL BE CALLED IN FOR IT.  PATIENT IS CALLING STATING SHE DOES STILL HAVE THE COUGH AND WOULD LIKE A PRESCRIPTION CALLED TO [facility] ON [address].

## 2024-05-22 RX ORDER — CODEINE PHOSPHATE/GUAIFENESIN 10-100MG/5
5 LIQUID (ML) ORAL 3 TIMES DAILY PRN
Qty: 120 ML | Refills: 0 | Status: SHIPPED | OUTPATIENT
Start: 2024-05-22 | End: 2024-05-30 | Stop reason: SDUPTHER

## 2024-05-23 ENCOUNTER — PATIENT MESSAGE (OUTPATIENT)
Dept: FAMILY MEDICINE CLINIC | Facility: CLINIC | Age: 59
End: 2024-05-23
Payer: MEDICAID

## 2024-05-24 NOTE — TELEPHONE ENCOUNTER
Pt left a message stating she is unable to get her RX     I called the pharmacy I was sent to the [facility] - I left a detailed vm asking for a call back to let us know what we need to do so the patient can get her prescription

## 2024-05-28 NOTE — TELEPHONE ENCOUNTER
From: Taylor Mo  To: Coco Sanford  Sent: 5/23/2024 6:32 PM EDT  Subject: [facility] requesting info    Hi Dr Sanford,     I cannot get [facility] from [facility] they said they have requested more information from your the office.     Thanks!

## 2024-05-30 DIAGNOSIS — J40 BRONCHITIS: ICD-10-CM

## 2024-05-30 NOTE — TELEPHONE ENCOUNTER
Caller: Taylor Mo    Relationship: Self    Best call back number: [phone]     Requested Prescriptions:   Requested Prescriptions     Pending Prescriptions Disp Refills    guaiFENesin-codeine (GUAIFENESIN AC) 100-10 MG/5ML liquid 120 mL 0     Sig: Take 5 mL by mouth 3 (Three) Times a Day As Needed for Cough.        Pharmacy where request should be sent: [id number] - [place], [place] - [address] [place] [address] [facility] [address] - [phone]  - [phone]     Last office visit with prescribing clinician: 5/10/2024   Last telemedicine visit with prescribing clinician: Visit date not found   Next office visit with prescribing clinician: Visit date not found     Additional details provided by patient: [facility] LONG[facility] ANY COU[facility] SYRUP WITH CODEINE.    Does the patient have less than a 3 day supply:  [x] Yes  [] No    Would you like a call back once the refill request has been completed: [] Yes [] No    If the office needs to give you a call back, can they leave a voicemail: [] Yes [] No    Blayne Espana Rep   05/30/24 12:51 EDT

## 2024-05-31 RX ORDER — CODEINE PHOSPHATE/GUAIFENESIN 10-100MG/5
5 LIQUID (ML) ORAL 3 TIMES DAILY PRN
Qty: 120 ML | Refills: 0 | Status: SHIPPED | OUTPATIENT
Start: 2024-05-31

## 2024-05-31 RX ORDER — CITALOPRAM 40 MG/1
TABLET ORAL
Qty: 90 TABLET | Refills: 1 | Status: SHIPPED | OUTPATIENT
Start: 2024-05-31

## 2024-06-09 RX ORDER — ATORVASTATIN CALCIUM 20 MG/1
TABLET, FILM COATED ORAL
Qty: 90 TABLET | Refills: 1 | Status: SHIPPED | OUTPATIENT
Start: 2024-06-09

## 2024-07-16 ENCOUNTER — TELEPHONE (OUTPATIENT)
Dept: FAMILY MEDICINE CLINIC | Facility: CLINIC | Age: 59
End: 2024-07-16

## 2024-07-16 DIAGNOSIS — Z12.31 ENCOUNTER FOR SCREENING MAMMOGRAM FOR BREAST CANCER: Primary | ICD-10-CM

## 2024-07-16 DIAGNOSIS — Z78.0 POSTMENOPAUSAL: ICD-10-CM

## 2024-07-16 NOTE — TELEPHONE ENCOUNTER
Caller: Taylor Mo    Relationship: Self    Best call back number:     [phone] (Mobile)       What orders are you requesting (i.e. lab or imaging): MAMMOGRAM     In what timeframe would the patient need to come in: SOON     Where will you receive your lab/imaging services: [facility]     Additional notes:  WOULD YOU ALSO RECOMMEND A DEXA SCAN AS WELL (THEY CAN DO THERE ALSO)

## 2024-08-19 RX ORDER — BUPROPION HYDROCHLORIDE 150 MG/1
TABLET ORAL
Qty: 90 TABLET | Refills: 1 | Status: SHIPPED | OUTPATIENT
Start: 2024-08-19

## 2024-08-23 RX ORDER — ALENDRONATE SODIUM 70 MG/1
70 TABLET ORAL
Qty: 12 TABLET | Refills: 1 | Status: SHIPPED | OUTPATIENT
Start: 2024-08-23

## 2024-11-24 RX ORDER — CITALOPRAM HYDROBROMIDE 40 MG/1
TABLET ORAL
Qty: 90 TABLET | Refills: 1 | Status: SHIPPED | OUTPATIENT
Start: 2024-11-24

## 2025-02-10 RX ORDER — ALENDRONATE SODIUM 70 MG/1
TABLET ORAL
Qty: 12 TABLET | Refills: 1 | Status: SHIPPED | OUTPATIENT
Start: 2025-02-10

## 2025-02-17 RX ORDER — BUPROPION HYDROCHLORIDE 150 MG/1
TABLET ORAL
Qty: 90 TABLET | Refills: 1 | Status: SHIPPED | OUTPATIENT
Start: 2025-02-17

## 2025-03-17 ENCOUNTER — OFFICE VISIT (OUTPATIENT)
Dept: FAMILY MEDICINE CLINIC | Facility: CLINIC | Age: 60
End: 2025-03-17
Payer: MEDICAID

## 2025-03-17 ENCOUNTER — LAB (OUTPATIENT)
Dept: FAMILY MEDICINE CLINIC | Facility: CLINIC | Age: 60
End: 2025-03-17
Payer: MEDICAID

## 2025-03-17 VITALS
HEIGHT: 64 IN | DIASTOLIC BLOOD PRESSURE: 83 MMHG | WEIGHT: 162 LBS | HEART RATE: 68 BPM | RESPIRATION RATE: 18 BRPM | OXYGEN SATURATION: 98 % | BODY MASS INDEX: 27.66 KG/M2 | SYSTOLIC BLOOD PRESSURE: 125 MMHG | TEMPERATURE: 97.9 F

## 2025-03-17 DIAGNOSIS — R19.09 GROIN MASS: Primary | ICD-10-CM

## 2025-03-17 LAB
ANION GAP SERPL CALCULATED.3IONS-SCNC: 9.3 MMOL/L (ref 5–15)
BASOPHILS # BLD AUTO: 0.06 10*3/MM3 (ref 0–0.2)
BASOPHILS NFR BLD AUTO: 0.8 % (ref 0–1.5)
BUN SERPL-MCNC: 11 MG/DL (ref 8–23)
BUN/CREAT SERPL: 12.5 (ref 7–25)
CALCIUM SPEC-SCNC: 9.4 MG/DL (ref 8.6–10.5)
CHLORIDE SERPL-SCNC: 101 MMOL/L (ref 98–107)
CO2 SERPL-SCNC: 27.7 MMOL/L (ref 22–29)
CREAT SERPL-MCNC: 0.88 MG/DL (ref 0.57–1)
DEPRECATED RDW RBC AUTO: 46 FL (ref 37–54)
EGFRCR SERPLBLD CKD-EPI 2021: 74.9 ML/MIN/1.73
EOSINOPHIL # BLD AUTO: 0.13 10*3/MM3 (ref 0–0.4)
EOSINOPHIL NFR BLD AUTO: 1.6 % (ref 0.3–6.2)
ERYTHROCYTE [DISTWIDTH] IN BLOOD BY AUTOMATED COUNT: 12.7 % (ref 12.3–15.4)
GLUCOSE SERPL-MCNC: 83 MG/DL (ref 65–99)
HCT VFR BLD AUTO: 46.2 % (ref 34–46.6)
HGB BLD-MCNC: 15.2 G/DL (ref 12–15.9)
IMM GRANULOCYTES # BLD AUTO: 0.04 10*3/MM3 (ref 0–0.05)
IMM GRANULOCYTES NFR BLD AUTO: 0.5 % (ref 0–0.5)
LYMPHOCYTES # BLD AUTO: 2.82 10*3/MM3 (ref 0.7–3.1)
LYMPHOCYTES NFR BLD AUTO: 35.4 % (ref 19.6–45.3)
MCH RBC QN AUTO: 32.5 PG (ref 26.6–33)
MCHC RBC AUTO-ENTMCNC: 32.9 G/DL (ref 31.5–35.7)
MCV RBC AUTO: 98.7 FL (ref 79–97)
MONOCYTES # BLD AUTO: 0.64 10*3/MM3 (ref 0.1–0.9)
MONOCYTES NFR BLD AUTO: 8 % (ref 5–12)
NEUTROPHILS NFR BLD AUTO: 4.27 10*3/MM3 (ref 1.7–7)
NEUTROPHILS NFR BLD AUTO: 53.7 % (ref 42.7–76)
NRBC BLD AUTO-RTO: 0 /100 WBC (ref 0–0.2)
PLATELET # BLD AUTO: 357 10*3/MM3 (ref 140–450)
PMV BLD AUTO: 10.2 FL (ref 6–12)
POTASSIUM SERPL-SCNC: 3.8 MMOL/L (ref 3.5–5.2)
RBC # BLD AUTO: 4.68 10*6/MM3 (ref 3.77–5.28)
SODIUM SERPL-SCNC: 138 MMOL/L (ref 136–145)
WBC NRBC COR # BLD AUTO: 7.96 10*3/MM3 (ref 3.4–10.8)

## 2025-03-17 PROCEDURE — 36415 COLL VENOUS BLD VENIPUNCTURE: CPT | Performed by: FAMILY MEDICINE

## 2025-03-17 PROCEDURE — 85025 COMPLETE CBC W/AUTO DIFF WBC: CPT | Performed by: FAMILY MEDICINE

## 2025-03-17 PROCEDURE — 99213 OFFICE O/P EST LOW 20 MIN: CPT | Performed by: FAMILY MEDICINE

## 2025-03-17 PROCEDURE — 80048 BASIC METABOLIC PNL TOTAL CA: CPT | Performed by: FAMILY MEDICINE

## 2025-03-17 NOTE — ASSESSMENT & PLAN NOTE
Possible lipoma vs soft lymph nodes.  Will check labs and CT scan as ordered.  Patient will call back if any fever, sweats, other areas of swelling, etc.

## 2025-03-17 NOTE — PROGRESS NOTES
"Chief Complaint  Groin Swelling    Subjective        Taylor Mo presents to [facility]    Left groin swelling  Symptoms are: new.   Onset was 1 to 6 months.   Symptoms occur: intermittently.  Symptoms include: joint pain, change in stool, nasal congestion, cough, diaphoresis, fatigue, headaches, joint swelling, neck pain and swollen glands.   Pertinent negative symptoms include no abdominal pain, no anorexia, no chest pain, no chills, no fever, no myalgias, no nausea, no numbness, no rash, no sore throat, no dysuria, no vertigo, no visual change, no vomiting and no weakness.   Treatment and/or Medications comments include: Ice/heat       Objective   Vital Signs:  /83 (BP Location: Left arm, Patient Position: Sitting, Cuff Size: Adult)   Pulse 68   Temp 97.9 °F (36.6 °C) (Temporal)   Resp 18   Ht 162.6 cm (64\")   Wt 73.5 kg (162 lb)   SpO2 98%   BMI 27.81 kg/m²   Estimated body mass index is 27.81 kg/m² as calculated from the following:    Height as of this encounter: 162.6 cm (64\").    Weight as of this encounter: 73.5 kg (162 lb).    BMI is >= 25 and <30. (Overweight) The following options were offered after discussion;: exercise counseling/recommendations      Physical Exam  Vitals and nursing note reviewed.   Constitutional:       General: She is not in acute distress.     Appearance: She is well-developed.   HENT:      Head: Normocephalic.   Eyes:      General: Lids are normal.      Conjunctiva/sclera: Conjunctivae normal.   Neck:      Thyroid: No thyroid mass or thyromegaly.      Trachea: Trachea normal.   Cardiovascular:      Rate and Rhythm: Normal rate and regular rhythm.      Heart sounds: Normal heart sounds.   Pulmonary:      Effort: Pulmonary effort is normal.      Breath sounds: Normal breath sounds.   Abdominal:      Palpations: Abdomen is soft.   Musculoskeletal:      Cervical back: Normal range of motion.   Lymphadenopathy:      Cervical: No cervical " adenopathy.      Lower Body: No right inguinal adenopathy. Left inguinal adenopathy present.   Skin:     General: Skin is warm and dry.   Neurological:      Mental Status: She is alert and oriented to person, place, and time.   Psychiatric:         Attention and Perception: She is attentive.         Mood and Affect: Mood normal.         Speech: Speech normal.         Behavior: Behavior normal.        Result Review :  The following data was reviewed by: Coco Sanford MD on 03/17/2025:              Assessment and Plan   Diagnoses and all orders for this visit:    1. Groin mass (Primary)  Assessment & Plan:  Possible lipoma vs soft lymph nodes.  Will check labs and CT scan as ordered.  Patient will call back if any fever, sweats, other areas of swelling, etc.     Orders:  -     CBC & Differential  -     Basic Metabolic Panel  -     CT Abdomen Pelvis With Contrast; Future             Follow Up   No follow-ups on file.  Patient was given instructions and counseling regarding her condition or for health maintenance advice. Please see specific information pulled into the AVS if appropriate.

## 2025-03-24 ENCOUNTER — TELEPHONE (OUTPATIENT)
Dept: FAMILY MEDICINE CLINIC | Facility: CLINIC | Age: 60
End: 2025-03-24
Payer: MEDICAID

## 2025-03-24 DIAGNOSIS — R19.09 GROIN MASS: Primary | ICD-10-CM

## 2025-03-24 NOTE — TELEPHONE ENCOUNTER
ROHAN BRANCH WITH [facility], CALLED STATING THAT HER CT ABDOMEN IS STILL PENDING PA. INSURANCE IS REQUESTING AN MOST RECENT ULTRASOUND TO BE SENT. ROHAN WOULD LIKE TO KNOW IF NOT APPROVED BY 1430 TODAY (03/24/25), THAT ITS OKAY TO RESCHEDULE CT. PLEASE CALL ROHAN BACK -[phone].

## 2025-03-24 NOTE — TELEPHONE ENCOUNTER
Ii don't want the patient to be responsible for a bill so if the approval for the CT has not been obtained I will change the order to an ultrasound. Please let the patient know what it going on.

## 2025-03-24 NOTE — TELEPHONE ENCOUNTER
ROHAN NOTIFIED TO CANCEL CT SCAN.    SPOKE WITH GINGER AND TOLD HER THAT DR. BRUSH IS SENDING AN ORDER FOR ULTRASOUND. SINCE INSURANCE HAS NOT APPROVED CT SCAN AND WAS REQUESTING ULTRASOUND. PATIENT GAVE VERBAL UNDERSTANDING

## 2025-03-27 ENCOUNTER — HOSPITAL ENCOUNTER (OUTPATIENT)
Dept: ULTRASOUND IMAGING | Facility: HOSPITAL | Age: 60
Discharge: HOME | End: 2025-03-27
Admitting: FAMILY MEDICINE
Payer: MEDICAID

## 2025-03-27 DIAGNOSIS — R19.09 GROIN MASS: ICD-10-CM

## 2025-03-27 PROCEDURE — 76857 US EXAM PELVIC LIMITED: CPT

## 2025-04-04 DIAGNOSIS — R19.09 GROIN MASS: Primary | ICD-10-CM

## 2025-04-18 ENCOUNTER — HOSPITAL ENCOUNTER (OUTPATIENT)
Dept: CT IMAGING | Facility: HOSPITAL | Age: 60
Discharge: HOME | End: 2025-04-18
Payer: MEDICAID

## 2025-04-18 DIAGNOSIS — R19.09 GROIN MASS: ICD-10-CM

## 2025-04-18 PROCEDURE — 74177 CT ABD & PELVIS W/CONTRAST: CPT

## 2025-04-18 PROCEDURE — 25510000001 IOPAMIDOL PER 1 ML: Performed by: FAMILY MEDICINE

## 2025-04-18 RX ORDER — IOPAMIDOL 755 MG/ML
100 INJECTION, SOLUTION INTRAVASCULAR
Status: COMPLETED | OUTPATIENT
Start: 2025-04-18 | End: 2025-04-18

## 2025-04-18 RX ADMIN — IOPAMIDOL 100 ML: 755 INJECTION, SOLUTION INTRAVENOUS at 09:02

## 2025-04-23 ENCOUNTER — OFFICE VISIT (OUTPATIENT)
Dept: FAMILY MEDICINE CLINIC | Facility: CLINIC | Age: 60
End: 2025-04-23
Payer: MEDICAID

## 2025-04-23 VITALS
DIASTOLIC BLOOD PRESSURE: 78 MMHG | WEIGHT: 125.2 LBS | HEIGHT: 64 IN | BODY MASS INDEX: 21.37 KG/M2 | TEMPERATURE: 97.9 F | OXYGEN SATURATION: 97 % | SYSTOLIC BLOOD PRESSURE: 120 MMHG | HEART RATE: 72 BPM

## 2025-04-23 DIAGNOSIS — H66.002 NON-RECURRENT ACUTE SUPPURATIVE OTITIS MEDIA OF LEFT EAR WITHOUT SPONTANEOUS RUPTURE OF TYMPANIC MEMBRANE: Primary | ICD-10-CM

## 2025-04-23 PROCEDURE — 99213 OFFICE O/P EST LOW 20 MIN: CPT | Performed by: FAMILY MEDICINE

## 2025-04-23 PROCEDURE — 1126F AMNT PAIN NOTED NONE PRSNT: CPT | Performed by: FAMILY MEDICINE

## 2025-04-23 RX ORDER — AMOXICILLIN 500 MG/1
500 CAPSULE ORAL 3 TIMES DAILY
Qty: 30 CAPSULE | Refills: 0 | Status: SHIPPED | OUTPATIENT
Start: 2025-04-23

## 2025-04-23 NOTE — PROGRESS NOTES
"Chief Complaint  Earache    Subjective        Taylor Mo presents to [facility]  Earache   There is pain in the left ear. This is a new problem. The current episode started in the past 7 days. The problem occurs constantly. The problem has been unchanged. There has been no fever. The pain is moderate. Pertinent negatives include no coughing, drainage, headaches, hearing loss, rhinorrhea or sore throat. She has tried nothing for the symptoms.       Objective   Vital Signs:  /78 (BP Location: Right arm, Patient Position: Sitting, Cuff Size: Adult)   Pulse 72   Temp 97.9 °F (36.6 °C) (Temporal)   Ht 162.6 cm (64\")   Wt 56.8 kg (125 lb 3.2 oz)   SpO2 97%   BMI 21.49 kg/m²   Estimated body mass index is 21.49 kg/m² as calculated from the following:    Height as of this encounter: 162.6 cm (64\").    Weight as of this encounter: 56.8 kg (125 lb 3.2 oz).    BMI is within normal parameters. No other follow-up for BMI required.      Physical Exam  Vitals and nursing note reviewed.   Constitutional:       General: She is not in acute distress.     Appearance: She is well-developed.   HENT:      Head: Normocephalic.      Right Ear: Tympanic membrane and ear canal normal.      Left Ear: Ear canal normal. A middle ear effusion is present. Tympanic membrane is erythematous.   Eyes:      General: Lids are normal.      Conjunctiva/sclera: Conjunctivae normal.   Neck:      Thyroid: No thyroid mass or thyromegaly.      Trachea: Trachea normal.   Cardiovascular:      Rate and Rhythm: Normal rate and regular rhythm.      Heart sounds: Normal heart sounds.   Pulmonary:      Effort: Pulmonary effort is normal.      Breath sounds: Normal breath sounds.   Musculoskeletal:      Cervical back: Normal range of motion.   Lymphadenopathy:      Cervical: No cervical adenopathy.   Skin:     General: Skin is warm and dry.   Neurological:      Mental Status: She is alert and oriented to person, place, " and time.   Psychiatric:         Attention and Perception: She is attentive.         Mood and Affect: Mood normal.         Speech: Speech normal.         Behavior: Behavior normal.        Result Review :  The following data was reviewed by: Coco Sanford MD on 04/23/2025:  Common labs          3/17/2025    08:12   Common Labs   Glucose 83    BUN 11    Creatinine 0.88    Sodium 138    Potassium 3.8    Chloride 101    Calcium 9.4    WBC 7.96    Hemoglobin 15.2    Hematocrit 46.2    Platelets 357                Assessment and Plan   Diagnoses and all orders for this visit:    1. Non-recurrent acute suppurative otitis media of left ear without spontaneous rupture of tympanic membrane (Primary)  -     amoxicillin (AMOXIL) 500 MG capsule; Take 1 capsule by mouth 3 (Three) Times a Day.  Dispense: 30 capsule; Refill: 0             Follow Up   No follow-ups on file.  Patient was given instructions and counseling regarding her condition or for health maintenance advice. Please see specific information pulled into the AVS if appropriate.

## 2025-05-05 RX ORDER — ATORVASTATIN CALCIUM 20 MG/1
20 TABLET, FILM COATED ORAL DAILY
Qty: 90 TABLET | Refills: 1 | Status: SHIPPED | OUTPATIENT
Start: 2025-05-05

## 2025-05-27 RX ORDER — CITALOPRAM HYDROBROMIDE 40 MG/1
40 TABLET ORAL DAILY
Qty: 90 TABLET | Refills: 1 | Status: SHIPPED | OUTPATIENT
Start: 2025-05-27

## 2025-07-30 RX ORDER — BUPROPION HYDROCHLORIDE 150 MG/1
150 TABLET ORAL DAILY
Qty: 90 TABLET | Refills: 1 | Status: SHIPPED | OUTPATIENT
Start: 2025-07-30

## 2025-07-31 RX ORDER — ALENDRONATE SODIUM 70 MG/1
TABLET ORAL
Qty: 12 TABLET | Refills: 1 | Status: SHIPPED | OUTPATIENT
Start: 2025-07-31

## 2025-08-13 RX ORDER — ATORVASTATIN CALCIUM 20 MG/1
20 TABLET, FILM COATED ORAL DAILY
Qty: 90 TABLET | Refills: 1 | Status: SHIPPED | OUTPATIENT
Start: 2025-08-13